# Patient Record
Sex: MALE | Race: OTHER | Employment: FULL TIME | URBAN - METROPOLITAN AREA
[De-identification: names, ages, dates, MRNs, and addresses within clinical notes are randomized per-mention and may not be internally consistent; named-entity substitution may affect disease eponyms.]

---

## 2023-12-23 RX ORDER — SODIUM CHLORIDE, SODIUM LACTATE, POTASSIUM CHLORIDE, CALCIUM CHLORIDE 600; 310; 30; 20 MG/100ML; MG/100ML; MG/100ML; MG/100ML
75 INJECTION, SOLUTION INTRAVENOUS CONTINUOUS
Status: CANCELLED | OUTPATIENT
Start: 2023-12-23

## 2023-12-26 ENCOUNTER — HOSPITAL ENCOUNTER (OUTPATIENT)
Dept: GASTROENTEROLOGY | Facility: AMBULARY SURGERY CENTER | Age: 38
Setting detail: OUTPATIENT SURGERY
Discharge: HOME | End: 2023-12-26
Payer: COMMERCIAL

## 2023-12-26 ENCOUNTER — ANESTHESIA (OUTPATIENT)
Dept: GASTROENTEROLOGY | Facility: AMBULARY SURGERY CENTER | Age: 38
End: 2023-12-26

## 2023-12-26 ENCOUNTER — ANESTHESIA EVENT (OUTPATIENT)
Dept: GASTROENTEROLOGY | Facility: AMBULARY SURGERY CENTER | Age: 38
End: 2023-12-26

## 2023-12-26 VITALS
WEIGHT: 193 LBS | SYSTOLIC BLOOD PRESSURE: 121 MMHG | HEART RATE: 64 BPM | BODY MASS INDEX: 27.63 KG/M2 | DIASTOLIC BLOOD PRESSURE: 79 MMHG | TEMPERATURE: 97.4 F | HEIGHT: 70 IN | OXYGEN SATURATION: 98 % | RESPIRATION RATE: 23 BRPM

## 2023-12-26 DIAGNOSIS — R10.9 ABDOMINAL PAIN, UNSPECIFIED ABDOMINAL LOCATION: ICD-10-CM

## 2023-12-26 DIAGNOSIS — R10.30 LOWER ABDOMINAL PAIN: ICD-10-CM

## 2023-12-26 PROCEDURE — 43239 EGD BIOPSY SINGLE/MULTIPLE: CPT | Performed by: INTERNAL MEDICINE

## 2023-12-26 PROCEDURE — 45380 COLONOSCOPY AND BIOPSY: CPT | Performed by: INTERNAL MEDICINE

## 2023-12-26 PROCEDURE — 88305 TISSUE EXAM BY PATHOLOGIST: CPT | Performed by: PATHOLOGY

## 2023-12-26 RX ORDER — PROPOFOL 10 MG/ML
INJECTION, EMULSION INTRAVENOUS AS NEEDED
Status: DISCONTINUED | OUTPATIENT
Start: 2023-12-26 | End: 2023-12-26

## 2023-12-26 RX ORDER — PHENYLEPHRINE HCL IN 0.9% NACL 1 MG/10 ML
SYRINGE (ML) INTRAVENOUS AS NEEDED
Status: DISCONTINUED | OUTPATIENT
Start: 2023-12-26 | End: 2023-12-26

## 2023-12-26 RX ORDER — SODIUM CHLORIDE, SODIUM LACTATE, POTASSIUM CHLORIDE, CALCIUM CHLORIDE 600; 310; 30; 20 MG/100ML; MG/100ML; MG/100ML; MG/100ML
75 INJECTION, SOLUTION INTRAVENOUS CONTINUOUS
Status: DISCONTINUED | OUTPATIENT
Start: 2023-12-26 | End: 2023-12-30 | Stop reason: HOSPADM

## 2023-12-26 RX ORDER — LIDOCAINE HYDROCHLORIDE 20 MG/ML
INJECTION, SOLUTION EPIDURAL; INFILTRATION; INTRACAUDAL; PERINEURAL AS NEEDED
Status: DISCONTINUED | OUTPATIENT
Start: 2023-12-26 | End: 2023-12-26

## 2023-12-26 RX ADMIN — SODIUM CHLORIDE, SODIUM LACTATE, POTASSIUM CHLORIDE, AND CALCIUM CHLORIDE 75 ML/HR: .6; .31; .03; .02 INJECTION, SOLUTION INTRAVENOUS at 10:36

## 2023-12-26 RX ADMIN — PROPOFOL 50 MG: 10 INJECTION, EMULSION INTRAVENOUS at 10:54

## 2023-12-26 RX ADMIN — PROPOFOL 200 MG: 10 INJECTION, EMULSION INTRAVENOUS at 10:51

## 2023-12-26 RX ADMIN — PROPOFOL 50 MG: 10 INJECTION, EMULSION INTRAVENOUS at 10:52

## 2023-12-26 RX ADMIN — PROPOFOL 50 MG: 10 INJECTION, EMULSION INTRAVENOUS at 11:01

## 2023-12-26 RX ADMIN — PROPOFOL 50 MG: 10 INJECTION, EMULSION INTRAVENOUS at 10:56

## 2023-12-26 RX ADMIN — LIDOCAINE HYDROCHLORIDE 100 MG: 20 INJECTION, SOLUTION EPIDURAL; INFILTRATION; INTRACAUDAL; PERINEURAL at 10:51

## 2023-12-26 RX ADMIN — PROPOFOL 50 MG: 10 INJECTION, EMULSION INTRAVENOUS at 10:58

## 2023-12-26 RX ADMIN — Medication 100 MCG: at 11:05

## 2023-12-26 NOTE — ANESTHESIA POSTPROCEDURE EVALUATION
Post-Op Assessment Note    CV Status:  Stable  Pain Score: 0    Pain management: adequate       Mental Status:  Alert   Hydration Status:  Stable   PONV Controlled:  None   Airway Patency:  Patent     Post Op Vitals Reviewed: Yes      Staff: CRNA               /78 (12/26/23 1116)    Temp (!) 97.4 °F (36.3 °C) (12/26/23 1116)    Pulse 84 (12/26/23 1116)   Resp 22 (12/26/23 1116)    SpO2 99 % (12/26/23 1116)

## 2023-12-26 NOTE — ANESTHESIA PREPROCEDURE EVALUATION
Procedure:  COLONOSCOPY  EGD    Relevant Problems   No relevant active problems        Physical Exam    Airway    Mallampati score: II  TM Distance: >3 FB  Neck ROM: full     Dental       Cardiovascular  Rhythm: regular, Rate: normal    Pulmonary   Breath sounds clear to auscultation    Other Findings        Anesthesia Plan  ASA Score- 1     Anesthesia Type- IV sedation with anesthesia with ASA Monitors.         Additional Monitors:     Airway Plan:            Plan Factors-    Chart reviewed.        Patient is not a current smoker.              Induction- intravenous.    Postoperative Plan-     Informed Consent- Anesthetic plan and risks discussed with patient.  I personally reviewed this patient with the CRNA. Discussed and agreed on the Anesthesia Plan with the CRNA..

## 2023-12-26 NOTE — H&P
"History and Physical -  [facility]  Radames Zepeda 38 y.o. male MRN: [id number]                  HPI: Radames Zepeda is a 38 y.o. year old male who presents for EGD and colonoscopy for assessment of abdominal pain      REVIEW OF SYSTEMS: Per the HPI, and otherwise unremarkable.    Historical Information   History reviewed. No pertinent past medical history.  History reviewed. No pertinent surgical history.  Social History   Social History     Substance and Sexual Activity   Alcohol Use Not Currently     Social History     Substance and Sexual Activity   Drug Use Never     Social History     Tobacco Use   Smoking Status Never   Smokeless Tobacco Never     Family History   Problem Relation Age of Onset    Cancer Mother        Meds/Allergies     (Not in a hospital admission)      No Known Allergies    Objective     Blood pressure 124/80, pulse 72, temperature (!) 96.9 °F (36.1 °C), resp. rate 18, height 5' 10\" (1.778 m), weight 87.5 kg (193 lb), SpO2 100%.      PHYSICAL EXAM    Gen: NAD  CV: RRR  CHEST: Clear  ABD: soft, NT/ND  EXT: no edema      ASSESSMENT/PLAN:  This is a 38 y.o. year old male here for EGD and colonoscopy           "

## 2023-12-28 PROCEDURE — 88305 TISSUE EXAM BY PATHOLOGIST: CPT | Performed by: PATHOLOGY

## 2024-01-25 ENCOUNTER — OFFICE VISIT (OUTPATIENT)
Dept: GASTROENTEROLOGY | Facility: CLINIC | Age: 39
End: 2024-01-25
Payer: COMMERCIAL

## 2024-01-25 VITALS
SYSTOLIC BLOOD PRESSURE: 120 MMHG | DIASTOLIC BLOOD PRESSURE: 80 MMHG | HEIGHT: 70 IN | TEMPERATURE: 97.4 F | BODY MASS INDEX: 28.2 KG/M2 | WEIGHT: 197 LBS

## 2024-01-25 DIAGNOSIS — R10.13 POSTPRANDIAL EPIGASTRIC PAIN: Primary | ICD-10-CM

## 2024-01-25 PROCEDURE — 99214 OFFICE O/P EST MOD 30 MIN: CPT | Performed by: PHYSICIAN ASSISTANT

## 2024-01-25 RX ORDER — HYOSCYAMINE SULFATE 0.125 MG
0.12 TABLET ORAL EVERY 4 HOURS PRN
Qty: 120 TABLET | Refills: 2 | Status: SHIPPED | OUTPATIENT
Start: 2024-01-25

## 2024-01-25 NOTE — PATIENT INSTRUCTIONS
HIDA scan & Gastric Emptying scheduled at [facility]. GE scheduled for 4/11/24 HIDA scan for 2/1/24   pt. accidentally took 5 pills of amlodipine/olmesartan 10/40mg at 8am. c/o dizziness, chest discomfort, headache and feeling of swelling of the neck. BP in triage WDL. pt. accidentally took 5 pills of amlodipine/olmesartan 10/40mg at 8am. c/o dizziness, chest discomfort, headache and feeling of "swelling of the neck" BP in triage WDL.

## 2024-01-25 NOTE — PROGRESS NOTES
"[facility] - Outpatient Follow-up Note  Radames Zepeda 38 y.o. male MRN: [id number]  Encounter: [id number]          ASSESSMENT AND PLAN:      Radames is a 39 y/o male who presents for follow-up.      1. L Flank pain  2. Post-prandial upper abdominal pain  Colonoscopy completely normal with random colon bx negative for microscopic colitis. EGD noted mild erythema with gastric bx negative for H.pylori and duodenal bx negative for celiac. KUB WNL. CTA 8/2023 also WNL. Today: he still notes that he gets L flank pain daily but he does believe this is MS related. However, he says recently he has also been getting LUQ pain that radiates to RUQ after eating daily. He says that he does have an appetite but when he eats, he is unable to finish his meals. He says he would like \"all remaining GI testing done\" at this time. He says the surgery team did not feel his umbilical hernia was the cause of any of his issues. Of note: pt says bentyl and omeprazole both did not help.   -please follow-up with PCP as again, I do suspect his L-flank pain is MS in etiology   -GES and HIDA scan both ordered   -start levsin PRN pain    -explained to pt that if both of these tests are also WNL, would recommend discussing functional etiology as he has been under a lot of stress lately   ______________________________________________________________________    SUBJECTIVE:  he still notes that he gets L flank pain daily but he does believe this is MS related. However, he says recently he has also been getting LUQ pain that radiates to RUQ after eating daily. He says that he does have an appetite but when he eats, he is unable to finish his meals. He says he would like \"all remaining GI testing done\" at this time. He says the surgery team did not feel his umbilical hernia was the cause of any of his issues. Pt says bentyl and omeprazole both did not help. Pt denies unintentional weight loss, fevers, " chills, n/v, dysphagia, constipation, or further diarrhea, bloody or black BM, NSAID use.       REVIEW OF SYSTEMS IS OTHERWISE NEGATIVE.      Historical Information   No past medical history on file.  No past surgical history on file.  Social History   Social History     Substance and Sexual Activity   Alcohol Use Not Currently     Social History     Substance and Sexual Activity   Drug Use Never     Social History     Tobacco Use   Smoking Status Never   Smokeless Tobacco Never     Family History   Problem Relation Age of Onset    Cancer Mother        Meds/Allergies       Current Outpatient Medications:     Cholecalciferol (Vitamin D3) 50 MCG (2000 UT) capsule    polyethylene glycol (GOLYTELY) 4000 mL solution    No Known Allergies        Objective     There were no vitals taken for this visit. There is no height or weight on file to calculate BMI.      PHYSICAL EXAM:      General Appearance:   Alert, cooperative, no distress   HEENT:   Normocephalic, atraumatic, anicteric.     Neck:  Supple, symmetrical, trachea midline   Lungs:   Clear to auscultation bilaterally; no rales, rhonchi or wheezing; respirations unlabored    Heart::   Regular rate and rhythm; no murmur, rub, or gallop.   Abdomen:   Soft, non-tender, non-distended; normal bowel sounds; no masses, no organomegaly    Genitalia:   Deferred    Rectal:   Deferred    Extremities:  No cyanosis, clubbing or edema    Pulses:  2+ and symmetric    Skin:  No jaundice, rashes, or lesions    Lymph nodes:  No palpable cervical lymphadenopathy        Lab Results:   No visits with results within 1 Day(s) from this visit.   Latest known visit with results is:   Hospital Outpatient Visit on 12/26/2023   Component Date Value    Case Report 12/26/2023                      Value:Surgical Pathology Report                         Case: [id number]                                   Authorizing Provider:  Kraig Gomes MD  Collected:           12/26/2023 1053               Ordering Location:     [facility]        Received:            12/26/2023 1520                                     [facility] [place]                                                    Pathologist:           Ephraim Miller MD                                                                 Specimens:   A) - Duodenum, small bowel bx ro celiac                                                             B) - Stomach, stomach bx ro celiac                                                                  C) - Colon, random colon bx RO microscopic colitis                                         Final Diagnosis 12/26/2023                      Value:This result contains rich text formatting which cannot be displayed here.    Note 12/26/2023                      Value:This result contains rich text formatting which cannot be displayed here.    Additional Information 12/26/2023                      Value:This result contains rich text formatting which cannot be displayed here.    Gross Description 12/26/2023                      Value:This result contains rich text formatting which cannot be displayed here.         Radiology Results:   XR HIP 2 TO 3 VIEWS WITHOUT PELVIS LEFT    Result Date: 1/19/2024  Narrative: Examination: Two-view left hip Comparisons: Left hip with AP pelvis dated 7/20/2023. INDICATION: Left hip pain. FINDINGS: 2 views of the left hip demonstrate no evidence of a fracture, malalignment or suspicious osseous lesion in the left hip. There is no evidence of arthritis in the left hip. The left SI joint is unremarkable. The soft tissues appear normal.    Impression: IMPRESSION: Unremarkable two-view left hip. Workstation:[id number]    EGD    Result Date: 12/26/2023  Narrative: Table formatting from the original result was not included. [facility] [place] [address] [facility] [address] [phone] DATE OF SERVICE: 12/26/23 PHYSICIAN(S): Attending: Kraig Ariza  MD Eliseo Fellow: No Staff Documented INDICATION: Abdominal pain, unspecified abdominal location POST-OP DIAGNOSIS: See the impression below. PREPROCEDURE: Informed consent was obtained for the procedure, including sedation.  Risks of perforation, hemorrhage, adverse drug reaction and aspiration were discussed. The patient was placed in the left lateral decubitus position. Patient was explained about the risks and benefits of the procedure. Risks including but not limited to bleeding, infection, and perforation were explained in detail. Also explained about less than 100% sensitivity with the exam and other alternatives. PROCEDURE: EGD DETAILS OF PROCEDURE: Patient was taken to the procedure room where a time out was performed to confirm correct patient and correct procedure. The patient underwent monitored anesthesia care, which was administered by an anesthesia professional. The patient's blood pressure, heart rate, level of consciousness, respirations and oxygen were monitored throughout the procedure. The scope was advanced to the second part of the duodenum. Retroflexion was performed in the fundus. The patient experienced no blood loss. The procedure was not difficult. The patient tolerated the procedure well. There were no apparent adverse events. ANESTHESIA INFORMATION: ASA: I Anesthesia Type: IV Sedation with Anesthesia MEDICATIONS: lactated ringers infusion 400 mL*  *From user-documented volume (Totals for administrations occurring from 1045 to 1111 on 12/26/23) FINDINGS: The upper third of the esophagus, middle third of the esophagus and lower third of the esophagus appeared normal. Regular Z-line 42 cm from the incisors Mild erythematous mucosa in the antrum; performed cold forceps biopsy to rule out H. pylori The 1st part of the duodenum and 2nd part of the duodenum appeared normal. Performed random biopsy using biopsy forceps to rule out celiac disease. SPECIMENS: ID Type Source Tests Collected by  Time Destination 1 : small bowel bx ro celiac Tissue Duodenum TISSUE EXAM Kraig Gomes MD 12/26/2023 10:53 AM  2 : stomach bx ro celiac Tissue Stomach TISSUE EXAM Kraig Gomes MD 12/26/2023 10:54 AM  3 : random colon bx RO microscopic colitis Tissue Colon TISSUE EXAM Kraig Gomes MD 12/26/2023 11:06 AM      Impression: The upper third of the esophagus, middle third of the esophagus and lower third of the esophagus appeared normal. Mild erythematous mucosa in the antrum; performed cold forceps biopsy The 1st part of the duodenum and 2nd part of the duodenum appeared normal. Performed random biopsy to rule out celiac disease. RECOMMENDATION:  Await pathology results   Kraig Gomes MD     Colonoscopy    Result Date: 12/26/2023  Narrative: Table formatting from the original result was not included. [facility] [address] [facility] [address] [phone] DATE OF SERVICE: 12/26/23 PHYSICIAN(S): Attending: Kraig Gomes MD Fellow: No Staff Documented INDICATION: Lower abdominal pain, Abdominal pain, unspecified abdominal location POST-OP DIAGNOSIS: See the impression below. HISTORY: Prior colonoscopy: No prior colonoscopy. BOWEL PREPARATION: Miralax/Dulcolax PREPROCEDURE: Informed consent was obtained for the procedure, including sedation. Risks including but not limited to bleeding, infection, perforation, adverse drug reaction and aspiration were explained in detail. Also explained about less than 100% sensitivity with the exam and other alternatives. The patient was placed in the left lateral decubitus position. Procedure: Colonoscopy DETAILS OF PROCEDURE: Patient was taken to the procedure room where a time out was performed to confirm correct patient and correct procedure. The patient underwent monitored anesthesia care, which was administered by an anesthesia professional. The patient's blood pressure, heart rate, level of  consciousness, oxygen, respirations and ECG were monitored throughout the procedure. A digital rectal exam was performed. The scope was introduced through the anus and advanced to the cecum. Retroflexion was performed in the rectum. The quality of bowel preparation was evaluated using the Perdido Bowel Preparation Scale with scores of: right colon = 2, transverse colon = 2, left colon = 2. The total BBPS score was 6. Bowel prep was adequate. The patient experienced no blood loss. The procedure was not difficult. The patient tolerated the procedure well. There were no apparent adverse events. ANESTHESIA INFORMATION: ASA: I Anesthesia Type: IV Sedation with Anesthesia MEDICATIONS: lactated ringers infusion 400 mL*  *From user-documented volume (Totals for administrations occurring from 1045 to 1111 on 12/26/23) FINDINGS: The cecum, ascending colon, transverse colon, descending colon, sigmoid colon and rectum appeared normal. Performed random biopsy using biopsy forceps. EVENTS: Procedure Events Event Event Time ENDO CECUM REACHED 12/26/2023 11:02 AM ENDO SCOPE OUT TIME 12/26/2023 11:10 AM SPECIMENS: ID Type Source Tests Collected by Time Destination 1 : small bowel bx ro celiac Tissue Duodenum TISSUE EXAM Kraig Gomes MD 12/26/2023 10:53 AM  2 : stomach bx ro celiac Tissue Stomach TISSUE EXAM Kraig Gomes MD 12/26/2023 10:54 AM  3 : random colon bx RO microscopic colitis Tissue Colon TISSUE EXAM Kraig Gomes MD 12/26/2023 11:06 AM  EQUIPMENT: Colonoscope -[id number]     Impression: The cecum, ascending colon, transverse colon, descending colon, sigmoid colon and rectum appeared normal. Performed random biopsy using biopsy forceps. RECOMMENDATION:  Repeat screening colonoscopy in 10 years   Kraig Gomes MD

## 2024-04-17 ENCOUNTER — OFFICE VISIT (OUTPATIENT)
Dept: FAMILY MEDICINE CLINIC | Facility: CLINIC | Age: 39
End: 2024-04-17
Payer: COMMERCIAL

## 2024-04-17 VITALS
TEMPERATURE: 97 F | SYSTOLIC BLOOD PRESSURE: 124 MMHG | DIASTOLIC BLOOD PRESSURE: 82 MMHG | OXYGEN SATURATION: 99 % | HEART RATE: 70 BPM | WEIGHT: 195 LBS | BODY MASS INDEX: 27.98 KG/M2

## 2024-04-17 DIAGNOSIS — L73.9 FOLLICULITIS: ICD-10-CM

## 2024-04-17 DIAGNOSIS — R10.12 LEFT UPPER QUADRANT PAIN: Primary | ICD-10-CM

## 2024-04-17 DIAGNOSIS — M54.9 MID BACK PAIN ON LEFT SIDE: ICD-10-CM

## 2024-04-17 PROCEDURE — 99214 OFFICE O/P EST MOD 30 MIN: CPT | Performed by: FAMILY MEDICINE

## 2024-04-17 RX ORDER — BACLOFEN 10 MG/1
10 TABLET ORAL 3 TIMES DAILY PRN
Qty: 30 TABLET | Refills: 0 | Status: SHIPPED | OUTPATIENT
Start: 2024-04-17

## 2024-04-17 RX ORDER — GABAPENTIN 100 MG/1
100 CAPSULE ORAL 3 TIMES DAILY
Qty: 90 CAPSULE | Refills: 0 | Status: SHIPPED | OUTPATIENT
Start: 2024-04-17

## 2024-04-17 RX ORDER — IBUPROFEN 800 MG/1
800 TABLET ORAL EVERY 8 HOURS PRN
Qty: 30 TABLET | Refills: 0 | Status: SHIPPED | OUTPATIENT
Start: 2024-04-17

## 2024-04-18 RX ORDER — CLINDAMYCIN PHOSPHATE 10 MG/G
GEL TOPICAL 2 TIMES DAILY
Qty: 60 G | Refills: 0 | Status: SHIPPED | OUTPATIENT
Start: 2024-04-18

## 2024-04-18 NOTE — PROGRESS NOTES
Subjective:      Patient ID: Radames Zepeda is a 39 y.o. male.    Has 2 months of right upper abdominal pain and also pain over his right mid back, saw GI and had normal EGD and colonoscopy and CT abdomen which were normal,pain worse with position and intermittent, denies diarrhea and constipation    Abdominal Pain  This is a recurrent problem. The current episode started more than 1 month ago. The onset quality is undetermined. The problem occurs daily. The most recent episode lasted 1 months. The problem has been waxing and waning. The pain is located in the generalized abdominal region. The pain is at a severity of 5/10. The quality of the pain is dull. The abdominal pain radiates to the left shoulder. Associated symptoms include belching, constipation, headaches, myalgias and weight loss. Pertinent negatives include no anorexia, arthralgias, diarrhea, dysuria, fever, flatus, frequency, hematochezia, hematuria, melena, nausea or vomiting. The pain is aggravated by being still. The pain is relieved by Movement. Prior diagnostic workup includes lower endoscopy.       Past Medical History:   Diagnosis Date    Clotting disorder (HCC) Months    Mouth    Headache(784.0) Month    Light left side       Family History   Problem Relation Age of Onset    Cancer Mother        History reviewed. No pertinent surgical history.     reports that he has never smoked. He has never used smokeless tobacco. He reports that he does not currently use alcohol. He reports that he does not use drugs.      Current Outpatient Medications:     baclofen 10 mg tablet, Take 1 tablet (10 mg total) by mouth 3 (three) times a day as needed for muscle spasms, Disp: 30 tablet, Rfl: 0    clindamycin 1 % gel, Apply topically 2 (two) times a day, Disp: 60 g, Rfl: 0    gabapentin (Neurontin) 100 mg capsule, Take 1 capsule (100 mg total) by mouth 3 (three) times a day, Disp: 90 capsule, Rfl: 0    ibuprofen (MOTRIN) 800 mg tablet, Take 1  tablet (800 mg total) by mouth every 8 (eight) hours as needed for moderate pain, Disp: 30 tablet, Rfl: 0    The following portions of the patient's history were reviewed and updated as appropriate: allergies, current medications, past family history, past medical history, past social history, past surgical history and problem list.    Review of Systems   Constitutional:  Positive for weight loss. Negative for fever.   Gastrointestinal:  Positive for abdominal pain and constipation. Negative for anorexia, diarrhea, flatus, hematochezia, melena, nausea and vomiting.   Genitourinary:  Negative for dysuria, frequency and hematuria.   Musculoskeletal:  Positive for back pain (thoracic) and myalgias. Negative for arthralgias.   Skin:         Lesion on forehead since this morning   Neurological:  Positive for headaches.         PHQ-2/9 Depression Screening    Little interest or pleasure in doing things: 0 - not at all  Feeling down, depressed, or hopeless: 0 - not at all  PHQ-2 Score: 0  PHQ-2 Interpretation: Negative depression screen             Objective:    /82 (BP Location: Left arm, Patient Position: Sitting, Cuff Size: Standard)   Pulse 70   Temp (!) 97 °F (36.1 °C) (Tympanic)   Wt 88.5 kg (195 lb)   SpO2 99%   BMI 27.98 kg/m²      Physical Exam  Vitals and nursing note reviewed.   Constitutional:       Appearance: Normal appearance.   HENT:      Right Ear: External ear normal.      Left Ear: External ear normal.   Eyes:      General:         Right eye: No discharge.         Left eye: No discharge.      Conjunctiva/sclera: Conjunctivae normal.   Cardiovascular:      Rate and Rhythm: Normal rate and regular rhythm.      Heart sounds: No murmur heard.  Pulmonary:      Effort: Pulmonary effort is normal.      Breath sounds: Normal breath sounds. No wheezing.   Abdominal:      General: There is no distension.      Palpations: Abdomen is soft.      Tenderness: There is no abdominal tenderness.    Musculoskeletal:         General: No tenderness.      Right lower leg: No edema.      Left lower leg: No edema.   Skin:     Findings: Lesion (folliculitis on forehead) present. No rash.   Neurological:      Mental Status: He is alert. Mental status is at baseline.   Psychiatric:         Mood and Affect: Mood normal.         Thought Content: Thought content normal.           Recent Results (from the past 8736 hour(s))   LACTATE    Collection Time: 08/28/23 11:32 PM   Result Value Ref Range    LACTATE 0.6 0.5 - 2.2 mmol/L   COMPREHENSIVE METABOLIC PANEL    Collection Time: 08/28/23 11:32 PM   Result Value Ref Range    Glucose 96 65 - 99 mg/dL    BUN 19 7 - 28 mg/dL    Creatinine 1.10 0.53 - 1.30 mg/dL    Sodium 139 135 - 145 mmol/L    Potassium 3.4 (L) 3.5 - 5.2 mmol/L    Chloride 103 100 - 109 mmol/L    Carbon Dioxide 27 21 - 31 mmol/L    Calcium 9.5 8.5 - 10.1 mg/dL    Alkaline Phosphatase 41 35 - 120 U/L    ALBUMIN 4.6 3.5 - 5.7 g/dL    Total Bilirubin 1.1 (H) 0.2 - 1.0 mg/dL    Protein, Total 7.3 6.3 - 8.3 g/dL    AST 14 <41 U/L    ALT 16 <56 U/L    ANION GAP 9 3 - 11    eGFRcr 87 >59    eGFR Comment Interpretive information: calculated GFR    Tissue Exam    Collection Time: 12/26/23 10:53 AM   Result Value Ref Range    Case Report       Surgical Pathology Report                         Case: [id number]                                   Authorizing Provider:  Kraig Gomes MD  Collected:           12/26/2023 1053              Ordering Location:     [facility]        Received:            12/26/2023 [facility]                                     [facility]                                                    Pathologist:           Ephraim Miller MD                                                                 Specimens:   A) - Duodenum, small bowel bx ro celiac                                                             B) - Stomach, stomach bx ro celiac                                                                   C) - Colon, random colon bx RO microscopic colitis                                         Final Diagnosis       A. Duodenum, small bowel bx ro celiac:  - Benign duodenal mucosa without specific histopathologic abnormality.  - No intraepithelial lymphocytosis and no villous blunting.  - No epithelial dysplasia and no evidence of malignancy.    B. Stomach, stomach bx:  - Gastric antral and oxyntic mucosa with chronic, inactive gastritis.  - Negative for intestinal metaplasia, dysplasia or carcinoma.  - No Helicobacter pylori is identified on H&E stained slides.    C. Colon, random colon bx RO microscopic colitis:  - Colonic mucosa with focal lymphoid aggregates..  - No evidence of lymphocytic or collagenous colitis.        Note       Interpretation performed at [facility], [address] [facility] [address]        Additional Information       All reported additional testing was performed with appropriately reactive controls.  These tests were developed and their performance characteristics determined by [facility] or appropriate performing facility, though some tests may be performed on tissues which have not been validated for performance characteristics (such as staining performed on alcohol exposed cell blocks and decalcified tissues).  Results should be interpreted with caution and in the context of the patients’ clinical condition. These tests may not be cleared or approved by the U.S. Food and Drug Administration, though the FDA has determined that such clearance or approval is not necessary. These tests are used for clinical purposes and they should not be regarded as investigational or for research. This laboratory has been approved by CLIA 88, designated as a high-complexity laboratory and is qualified to perform these tests.  .      Gross Description       A. The specimen is received in formalin, labeled with the patient's name and hospital number, and is  "designated \" small bowel\".  Specimen consists of 1 tan soft tissue fragment measuring 0.4 cm in greatest dimension.  Entirely submitted. One screened cassette.  B. The specimen is received in formalin, labeled with the patient's name and hospital number, and is designated \" stomach\".  The specimen consists of 3 tan soft tissue fragments measuring range from 0.3-0.5 cm in greatest dimension.  Entirely submitted. One screened cassette.  C. The specimen is received in formalin, labeled with the patient's name and hospital number, and is designated \" random colon\".  The specimen consists of multiple tan soft tissue fragments measuring in aggregate of 0.5 x 0.5 x 0.1 cm.  Entirely submitted. One screened cassette.    Note: The estimated total formalin fixation time based upon information provided by the submitting clinician and the standard processing schedule is under 72 hours.      Supa         Laboratory Results: I have personally reviewed the pertinent laboratory results/reports     Radiology/Other Diagnostic Testing Results: I have personally reviewed pertinent reports.      EGD    Result Date: 12/26/2023  Table formatting from the original result was not included. [facility] [place] [address] [facility] [address] [phone] DATE OF SERVICE: 12/26/23 PHYSICIAN(S): Attending: Kraig Gomes MD Fellow: No Staff Documented INDICATION: Abdominal pain, unspecified abdominal location POST-OP DIAGNOSIS: See the impression below. PREPROCEDURE: Informed consent was obtained for the procedure, including sedation.  Risks of perforation, hemorrhage, adverse drug reaction and aspiration were discussed. The patient was placed in the left lateral decubitus position. Patient was explained about the risks and benefits of the procedure. Risks including but not limited to bleeding, infection, and perforation were explained in detail. Also explained about less than 100% sensitivity with " the exam and other alternatives. PROCEDURE: EGD DETAILS OF PROCEDURE: Patient was taken to the procedure room where a time out was performed to confirm correct patient and correct procedure. The patient underwent monitored anesthesia care, which was administered by an anesthesia professional. The patient's blood pressure, heart rate, level of consciousness, respirations and oxygen were monitored throughout the procedure. The scope was advanced to the second part of the duodenum. Retroflexion was performed in the fundus. The patient experienced no blood loss. The procedure was not difficult. The patient tolerated the procedure well. There were no apparent adverse events. ANESTHESIA INFORMATION: ASA: I Anesthesia Type: IV Sedation with Anesthesia MEDICATIONS: lactated ringers infusion 400 mL*  *From user-documented volume (Totals for administrations occurring from 1045 to 1111 on 12/26/23) FINDINGS: The upper third of the esophagus, middle third of the esophagus and lower third of the esophagus appeared normal. Regular Z-line 42 cm from the incisors Mild erythematous mucosa in the antrum; performed cold forceps biopsy to rule out H. pylori The 1st part of the duodenum and 2nd part of the duodenum appeared normal. Performed random biopsy using biopsy forceps to rule out celiac disease. SPECIMENS: ID Type Source Tests Collected by Time Destination 1 : small bowel bx ro celiac Tissue Duodenum TISSUE EXAM Kraig Gomes MD 12/26/2023 10:53 AM  2 : stomach bx ro celiac Tissue Stomach TISSUE EXAM Kraig Gomes MD 12/26/2023 10:54 AM  3 : random colon bx RO microscopic colitis Tissue Colon TISSUE EXAM Kraig Gomes MD 12/26/2023 11:06 AM      The upper third of the esophagus, middle third of the esophagus and lower third of the esophagus appeared normal. Mild erythematous mucosa in the antrum; performed cold forceps biopsy The 1st part of the duodenum and 2nd part of the duodenum appeared normal.  Performed random biopsy to rule out celiac disease. RECOMMENDATION:  Await pathology results   Kraig Gomes MD     Colonoscopy    Result Date: 12/26/2023  Table formatting from the original result was not included. [facility] [address] [facility] [place] [address] [phone] DATE OF SERVICE: 12/26/23 PHYSICIAN(S): Attending: Kraig Gomes MD Fellow: No Staff Documented INDICATION: Lower abdominal pain, Abdominal pain, unspecified abdominal location POST-OP DIAGNOSIS: See the impression below. HISTORY: Prior colonoscopy: No prior colonoscopy. BOWEL PREPARATION: Miralax/Dulcolax PREPROCEDURE: Informed consent was obtained for the procedure, including sedation. Risks including but not limited to bleeding, infection, perforation, adverse drug reaction and aspiration were explained in detail. Also explained about less than 100% sensitivity with the exam and other alternatives. The patient was placed in the left lateral decubitus position. Procedure: Colonoscopy DETAILS OF PROCEDURE: Patient was taken to the procedure room where a time out was performed to confirm correct patient and correct procedure. The patient underwent monitored anesthesia care, which was administered by an anesthesia professional. The patient's blood pressure, heart rate, level of consciousness, oxygen, respirations and ECG were monitored throughout the procedure. A digital rectal exam was performed. The scope was introduced through the anus and advanced to the cecum. Retroflexion was performed in the rectum. The quality of bowel preparation was evaluated using the [place] Bowel Preparation Scale with scores of: right colon = 2, transverse colon = 2, left colon = 2. The total BBPS score was 6. Bowel prep was adequate. The patient experienced no blood loss. The procedure was not difficult. The patient tolerated the procedure well. There were no apparent adverse events. ANESTHESIA INFORMATION:  ASA: I Anesthesia Type: IV Sedation with Anesthesia MEDICATIONS: lactated ringers infusion 400 mL*  *From user-documented volume (Totals for administrations occurring from 1045 to 1111 on 12/26/23) FINDINGS: The cecum, ascending colon, transverse colon, descending colon, sigmoid colon and rectum appeared normal. Performed random biopsy using biopsy forceps. EVENTS: Procedure Events Event Event Time ENDO CECUM REACHED 12/26/2023 11:02 AM ENDO SCOPE OUT TIME 12/26/2023 11:10 AM SPECIMENS: ID Type Source Tests Collected by Time Destination 1 : small bowel bx ro celiac Tissue Duodenum TISSUE EXAM Kraig Gomes MD 12/26/2023 10:53 AM  2 : stomach bx ro celiac Tissue Stomach TISSUE EXAM Kraig Gomes MD 12/26/2023 10:54 AM  3 : random colon bx RO microscopic colitis Tissue Colon TISSUE EXAM Kraig Gomes MD 12/26/2023 11:06 AM  EQUIPMENT: Colonoscope -[id number]     The cecum, ascending colon, transverse colon, descending colon, sigmoid colon and rectum appeared normal. Performed random biopsy using biopsy forceps. RECOMMENDATION:  Repeat screening colonoscopy in 10 years   Kraig Gomes MD        Assessment/Plan:  Problem List Items Addressed This Visit    None  Visit Diagnoses       Left upper quadrant pain    -  Primary    Relevant Orders    Comprehensive metabolic panel    Lipase    Amylase    Encounter for immunization        Mid back pain on left side        Relevant Medications    ibuprofen (MOTRIN) 800 mg tablet    gabapentin (Neurontin) 100 mg capsule    baclofen 10 mg tablet    Other Relevant Orders    XR spine thoracic 3 vw    Folliculitis        Relevant Medications    clindamycin 1 % gel            Tdap today  Reviewed and discussed labs  Suspect radiating thoracic back pain to the abdomen, given it is worse with laying and ongoing for months with pain free intervals and normal GI workup  Check xray thoracic spine, suspect this is musculoskeletal  Start ibuprofen prn ,  "baclofen hs and gabapentin 100 mg tid                  Read package inserts for all medications before starting a new medications, call me if you have any questions.    Patient was given opportunity to ask questions and all questions were answered.    Disclaimer: Portions of the record may have been created with voice recognition software. Occasional wrong word or \"sound a like\" substitutions may have occurred due to the inherent limitations of voice recognition software. Read the chart carefully and recognize, using context, where substitutions have occurred. I have used the Epic copy/forward function to compose this note. I have reviewed my current note to ensure it reflects the current patient status, exam, assessment and plan.    "

## 2024-04-20 ENCOUNTER — HOSPITAL ENCOUNTER (OUTPATIENT)
Dept: RADIOLOGY | Facility: HOSPITAL | Age: 39
Discharge: HOME | End: 2024-04-20
Payer: COMMERCIAL

## 2024-04-20 ENCOUNTER — APPOINTMENT (OUTPATIENT)
Dept: LAB | Facility: HOSPITAL | Age: 39
End: 2024-04-20
Payer: COMMERCIAL

## 2024-04-20 DIAGNOSIS — R10.12 LEFT UPPER QUADRANT PAIN: ICD-10-CM

## 2024-04-20 DIAGNOSIS — M54.9 MID BACK PAIN ON LEFT SIDE: ICD-10-CM

## 2024-04-20 LAB
ALBUMIN SERPL BCP-MCNC: 4.5 G/DL (ref 3.5–5)
ALP SERPL-CCNC: 31 U/L (ref 34–104)
ALT SERPL W P-5'-P-CCNC: 12 U/L (ref 7–52)
AMYLASE SERPL-CCNC: 48 IU/L (ref 29–103)
ANION GAP SERPL CALCULATED.3IONS-SCNC: 8 MMOL/L (ref 4–13)
AST SERPL W P-5'-P-CCNC: 14 U/L (ref 13–39)
BILIRUB SERPL-MCNC: 1.54 MG/DL (ref 0.2–1)
BUN SERPL-MCNC: 16 MG/DL (ref 5–25)
CALCIUM SERPL-MCNC: 9.5 MG/DL (ref 8.4–10.2)
CHLORIDE SERPL-SCNC: 105 MMOL/L (ref 96–108)
CO2 SERPL-SCNC: 27 MMOL/L (ref 21–32)
CREAT SERPL-MCNC: 1.02 MG/DL (ref 0.6–1.3)
GFR SERPL CREATININE-BSD FRML MDRD: 91 ML/MIN/1.73SQ M
GLUCOSE P FAST SERPL-MCNC: 106 MG/DL (ref 65–99)
LIPASE SERPL-CCNC: 11 U/L (ref 11–82)
POTASSIUM SERPL-SCNC: 4 MMOL/L (ref 3.5–5.3)
PROT SERPL-MCNC: 7.1 G/DL (ref 6.4–8.4)
SODIUM SERPL-SCNC: 140 MMOL/L (ref 135–147)

## 2024-04-20 PROCEDURE — 36415 COLL VENOUS BLD VENIPUNCTURE: CPT

## 2024-04-20 PROCEDURE — 72072 X-RAY EXAM THORAC SPINE 3VWS: CPT

## 2024-04-20 PROCEDURE — 82150 ASSAY OF AMYLASE: CPT

## 2024-04-20 PROCEDURE — 80053 COMPREHEN METABOLIC PANEL: CPT

## 2024-04-20 PROCEDURE — 83690 ASSAY OF LIPASE: CPT

## 2024-04-22 DIAGNOSIS — R17 ELEVATED BILIRUBIN: Primary | ICD-10-CM

## 2024-04-26 ENCOUNTER — HOSPITAL ENCOUNTER (OUTPATIENT)
Dept: ULTRASOUND IMAGING | Facility: HOSPITAL | Age: 39
End: 2024-04-26
Attending: FAMILY MEDICINE
Payer: COMMERCIAL

## 2024-04-26 DIAGNOSIS — R17 ELEVATED BILIRUBIN: ICD-10-CM

## 2024-04-26 PROCEDURE — 76705 ECHO EXAM OF ABDOMEN: CPT

## 2024-05-13 ENCOUNTER — RA CDI HCC (OUTPATIENT)
Dept: OTHER | Facility: HOSPITAL | Age: 39
End: 2024-05-13

## 2024-05-13 NOTE — PROGRESS NOTES
HCC coding opportunities       Chart reviewed, no opportunity found: CHART REVIEWED, NO OPPORTUNITY FOUND        Patients Insurance        Commercial Insurance: [employer]

## 2024-05-15 DIAGNOSIS — L73.9 FOLLICULITIS: ICD-10-CM

## 2024-05-16 RX ORDER — CLINDAMYCIN PHOSPHATE 10 MG/G
GEL TOPICAL 2 TIMES DAILY
Qty: 60 G | Refills: 0 | Status: SHIPPED | OUTPATIENT
Start: 2024-05-16 | End: 2024-05-20

## 2024-05-20 ENCOUNTER — OFFICE VISIT (OUTPATIENT)
Dept: FAMILY MEDICINE CLINIC | Facility: CLINIC | Age: 39
End: 2024-05-20
Payer: COMMERCIAL

## 2024-05-20 VITALS
DIASTOLIC BLOOD PRESSURE: 80 MMHG | SYSTOLIC BLOOD PRESSURE: 120 MMHG | OXYGEN SATURATION: 98 % | HEART RATE: 59 BPM | BODY MASS INDEX: 27.75 KG/M2 | WEIGHT: 193.8 LBS | TEMPERATURE: 97.4 F | HEIGHT: 70 IN

## 2024-05-20 DIAGNOSIS — N40.0 PROSTATE ENLARGEMENT: ICD-10-CM

## 2024-05-20 DIAGNOSIS — Z11.59 NEED FOR HEPATITIS C SCREENING TEST: ICD-10-CM

## 2024-05-20 DIAGNOSIS — R20.2 PARESTHESIA OF FOOT: ICD-10-CM

## 2024-05-20 DIAGNOSIS — Z13.6 ENCOUNTER FOR LIPID SCREENING FOR CARDIOVASCULAR DISEASE: ICD-10-CM

## 2024-05-20 DIAGNOSIS — Z23 ENCOUNTER FOR IMMUNIZATION: ICD-10-CM

## 2024-05-20 DIAGNOSIS — Z13.1 ENCOUNTER FOR SCREENING FOR DIABETES MELLITUS: ICD-10-CM

## 2024-05-20 DIAGNOSIS — E55.9 VITAMIN D DEFICIENCY: ICD-10-CM

## 2024-05-20 DIAGNOSIS — R20.2 PARESTHESIAS IN RIGHT HAND: ICD-10-CM

## 2024-05-20 DIAGNOSIS — Z13.220 ENCOUNTER FOR LIPID SCREENING FOR CARDIOVASCULAR DISEASE: ICD-10-CM

## 2024-05-20 DIAGNOSIS — M54.9 MID BACK PAIN ON LEFT SIDE: ICD-10-CM

## 2024-05-20 DIAGNOSIS — Z12.5 PROSTATE CANCER SCREENING ENCOUNTER, OPTIONS AND RISKS DISCUSSED: ICD-10-CM

## 2024-05-20 DIAGNOSIS — Z00.01 ENCOUNTER FOR GENERAL ADULT MEDICAL EXAMINATION WITH ABNORMAL FINDINGS: Primary | ICD-10-CM

## 2024-05-20 DIAGNOSIS — E80.4 GILBERT SYNDROME: ICD-10-CM

## 2024-05-20 PROCEDURE — 99214 OFFICE O/P EST MOD 30 MIN: CPT | Performed by: FAMILY MEDICINE

## 2024-05-20 PROCEDURE — 90471 IMMUNIZATION ADMIN: CPT | Performed by: FAMILY MEDICINE

## 2024-05-20 PROCEDURE — 99396 PREV VISIT EST AGE 40-64: CPT | Performed by: FAMILY MEDICINE

## 2024-05-20 PROCEDURE — 90715 TDAP VACCINE 7 YRS/> IM: CPT | Performed by: FAMILY MEDICINE

## 2024-05-20 RX ORDER — GABAPENTIN 100 MG/1
100 CAPSULE ORAL 3 TIMES DAILY
Qty: 90 CAPSULE | Refills: 0 | Status: SHIPPED | OUTPATIENT
Start: 2024-05-20

## 2024-05-20 NOTE — PROGRESS NOTES
Adult Annual Physical  Name: Radames Zepeda      : 1985      MRN: [id number]  Encounter Provider: Angela Napier MD  Encounter Date: 2024   Encounter department: [facility] MEDICINE    Assessment & Plan   1. Encounter for general adult medical examination with abnormal findings  -     CBC and differential; Future  -     Lipid panel; Future  -     Hemoglobin A1C; Future  2. Encounter for immunization  -     TDAP VACCINE GREATER THAN OR EQUAL TO 6YO IM  3. Gilbert syndrome  4. Prostate cancer screening encounter, options and risks discussed  5. Prostate enlargement  -     PSA, total and free; Future  6. Paresthesia of foot  -     Vitamin B12; Future  -     Folate; Future  -     Magnesium; Future  -     CBC and differential; Future  -     Vitamin D 25 hydroxy; Future  7. Paresthesias in right hand  -     Vitamin B12; Future  -     Folate; Future  -     Magnesium; Future  -     CBC and differential; Future  -     Vitamin D 25 hydroxy; Future  8. Vitamin D deficiency  -     Vitamin D 25 hydroxy; Future  9. Encounter for screening for diabetes mellitus  -     Hemoglobin A1C; Future  10. Encounter for lipid screening for cardiovascular disease  -     Lipid panel; Future  11. Need for hepatitis C screening test  12. Mid back pain on left side  -     gabapentin (Neurontin) 100 mg capsule; Take 1 capsule (100 mg total) by mouth 3 (three) times a day  Recommend labs as above, start gabapentin as previously discussed. Discussed cmp- with elevated biliaurbin, normal US liver and gall bladder, has Gilbert syndrome. No further intervention needed.  Immunizations and preventive care screenings were discussed with patient today. Appropriate education was printed on patient's after visit summary.    Discussed risks and benefits of prostate cancer screening. We discussed the controversial history of PSA screening for prostate cancer in the [place] as well as the risk of over  detection and over treatment of prostate cancer by way of PSA screening.  The patient understands that PSA blood testing is an imperfect way to screen for prostate cancer and that elevated PSA levels in the blood may also be caused by infection, inflammation, prostatic trauma or manipulation, urological procedures, or by benign prostatic enlargement.    The role of the digital rectal examination in prostate cancer screening was also discussed and I discussed with him that there is large interobserver variability in the findings of digital rectal examination.    Counseling:  Alcohol/drug use: discussed moderation in alcohol intake, the recommendations for healthy alcohol use, and avoidance of illicit drug use.  Dental Health: discussed importance of regular tooth brushing, flossing, and dental visits.  Injury prevention: discussed safety/seat belts, safety helmets, smoke detectors, carbon dioxide detectors, and smoking near bedding or upholstery.  Sexual health: discussed sexually transmitted diseases, partner selection, use of condoms, avoidance of unintended pregnancy, and contraceptive alternatives.  Exercise: the importance of regular exercise/physical activity was discussed. Recommend exercise 3-5 times per week for at least 30 minutes.          History of Present Illness     Adult Annual Physical:  Patient presents for annual physical. Here for follow up and annual physical, did not start gabapentin as he did not have pain, does feel pulling in stretching over left upper abdomen  He does take muscle relaxer which helps  Has tingling and numbness in right middle finger, left great toe.     Diet and Physical Activity:  - Diet/Nutrition: well balanced diet, limited junk food and low fat diet.  - Exercise: moderate cardiovascular exercise.    General Health:  - Sleep: sleeps well.  - Hearing:. grossly normal  - Vision: no vision problems.  - Dental: no dental visits for > 1 year.    /GYN Health:    - History of  "STDs: no     Health:  - History of STDs: no.     Review of Systems   Constitutional:  Negative for chills and fever.   HENT:  Negative for congestion, rhinorrhea and sore throat.    Eyes:  Negative for discharge, redness and itching.   Respiratory:  Negative for chest tightness, shortness of breath and wheezing.    Cardiovascular:  Negative for chest pain and palpitations.   Gastrointestinal:  Negative for abdominal pain, constipation and diarrhea.   Genitourinary:  Negative for dysuria.   Musculoskeletal:  Positive for back pain.   Skin:  Negative for pallor and rash.   Neurological:  Positive for numbness. Negative for dizziness, weakness and headaches.     Medical History Reviewed by provider this encounter:  Tobacco  Allergies  Meds  Problems  Med Hx  Surg Hx  Fam Hx         Objective     /80 (BP Location: Right arm)   Pulse 59   Temp (!) 97.4 °F (36.3 °C) (Tympanic)   Ht 5' 9.69\" (1.77 m)   Wt 87.9 kg (193 lb 12.8 oz)   SpO2 98%   BMI 28.06 kg/m²     Physical Exam  Vitals and nursing note reviewed.   Constitutional:       General: He is not in acute distress.     Appearance: Normal appearance. He is well-developed and normal weight. He is not ill-appearing or toxic-appearing.   HENT:      Head: Normocephalic and atraumatic.      Right Ear: Tympanic membrane, ear canal and external ear normal.      Left Ear: Tympanic membrane, ear canal and external ear normal.      Nose: No mucosal edema or rhinorrhea.      Mouth/Throat:      Mouth: Mucous membranes are moist. Mucous membranes are not pale and not cyanotic.      Pharynx: Oropharynx is clear. No oropharyngeal exudate or posterior oropharyngeal erythema.   Eyes:      General: No scleral icterus.        Right eye: No discharge.         Left eye: No discharge.      Extraocular Movements: Extraocular movements intact.      Conjunctiva/sclera: Conjunctivae normal.      Pupils: Pupils are equal, round, and reactive to light.   Cardiovascular:      " Rate and Rhythm: Normal rate and regular rhythm.      Heart sounds: Normal heart sounds. No murmur heard.     No gallop.   Pulmonary:      Effort: Pulmonary effort is normal. No respiratory distress.      Breath sounds: Normal breath sounds. No wheezing or rales.   Abdominal:      General: Abdomen is flat.      Palpations: Abdomen is soft.      Tenderness: There is no abdominal tenderness.   Musculoskeletal:         General: No swelling, tenderness, deformity or signs of injury.      Cervical back: Normal range of motion.      Right lower leg: No edema.      Left lower leg: No edema.   Skin:     General: Skin is warm.      Coloration: Skin is not jaundiced or pale.      Findings: No rash.   Neurological:      General: No focal deficit present.      Mental Status: He is alert and oriented to person, place, and time.   Psychiatric:         Mood and Affect: Mood normal.         Behavior: Behavior normal.         Thought Content: Thought content normal.         Judgment: Judgment normal.       Administrative Statements

## 2024-10-19 ENCOUNTER — APPOINTMENT (OUTPATIENT)
Dept: LAB | Facility: HOSPITAL | Age: 39
End: 2024-10-19
Attending: FAMILY MEDICINE
Payer: COMMERCIAL

## 2024-10-19 DIAGNOSIS — Z13.220 ENCOUNTER FOR LIPID SCREENING FOR CARDIOVASCULAR DISEASE: ICD-10-CM

## 2024-10-19 DIAGNOSIS — Z13.6 ENCOUNTER FOR LIPID SCREENING FOR CARDIOVASCULAR DISEASE: ICD-10-CM

## 2024-10-19 DIAGNOSIS — Z13.1 ENCOUNTER FOR SCREENING FOR DIABETES MELLITUS: ICD-10-CM

## 2024-10-19 DIAGNOSIS — R20.2 PARESTHESIAS IN RIGHT HAND: ICD-10-CM

## 2024-10-19 DIAGNOSIS — E55.9 VITAMIN D DEFICIENCY: ICD-10-CM

## 2024-10-19 DIAGNOSIS — N40.0 PROSTATE ENLARGEMENT: ICD-10-CM

## 2024-10-19 DIAGNOSIS — R20.2 PARESTHESIA OF FOOT: ICD-10-CM

## 2024-10-19 DIAGNOSIS — Z00.01 ENCOUNTER FOR GENERAL ADULT MEDICAL EXAMINATION WITH ABNORMAL FINDINGS: ICD-10-CM

## 2024-10-19 LAB
25(OH)D3 SERPL-MCNC: 14.8 NG/ML (ref 30–100)
BASOPHILS # BLD AUTO: 0.05 THOUSANDS/ΜL (ref 0–0.1)
BASOPHILS NFR BLD AUTO: 1 % (ref 0–1)
CHOLEST SERPL-MCNC: 192 MG/DL
EOSINOPHIL # BLD AUTO: 0.21 THOUSAND/ΜL (ref 0–0.61)
EOSINOPHIL NFR BLD AUTO: 4 % (ref 0–6)
ERYTHROCYTE [DISTWIDTH] IN BLOOD BY AUTOMATED COUNT: 12.6 % (ref 11.6–15.1)
EST. AVERAGE GLUCOSE BLD GHB EST-MCNC: 111 MG/DL
FOLATE SERPL-MCNC: 10.2 NG/ML
HBA1C MFR BLD: 5.5 %
HCT VFR BLD AUTO: 48.3 % (ref 36.5–49.3)
HDLC SERPL-MCNC: 43 MG/DL
HGB BLD-MCNC: 15.8 G/DL (ref 12–17)
IMM GRANULOCYTES # BLD AUTO: 0.02 THOUSAND/UL (ref 0–0.2)
IMM GRANULOCYTES NFR BLD AUTO: 0 % (ref 0–2)
LDLC SERPL CALC-MCNC: 134 MG/DL (ref 0–100)
LYMPHOCYTES # BLD AUTO: 1.59 THOUSANDS/ΜL (ref 0.6–4.47)
LYMPHOCYTES NFR BLD AUTO: 29 % (ref 14–44)
MAGNESIUM SERPL-MCNC: 2.2 MG/DL (ref 1.9–2.7)
MCH RBC QN AUTO: 29.6 PG (ref 26.8–34.3)
MCHC RBC AUTO-ENTMCNC: 32.7 G/DL (ref 31.4–37.4)
MCV RBC AUTO: 91 FL (ref 82–98)
MONOCYTES # BLD AUTO: 0.58 THOUSAND/ΜL (ref 0.17–1.22)
MONOCYTES NFR BLD AUTO: 11 % (ref 4–12)
NEUTROPHILS # BLD AUTO: 3.02 THOUSANDS/ΜL (ref 1.85–7.62)
NEUTS SEG NFR BLD AUTO: 55 % (ref 43–75)
NONHDLC SERPL-MCNC: 149 MG/DL
NRBC BLD AUTO-RTO: 0 /100 WBCS
PLATELET # BLD AUTO: 210 THOUSANDS/UL (ref 149–390)
PMV BLD AUTO: 11.6 FL (ref 8.9–12.7)
PSA FREE MFR SERPL: 52.24 %
PSA FREE SERPL-MCNC: 0.21 NG/ML
PSA SERPL-MCNC: 0.4 NG/ML (ref 0–4)
RBC # BLD AUTO: 5.33 MILLION/UL (ref 3.88–5.62)
TRIGL SERPL-MCNC: 75 MG/DL
VIT B12 SERPL-MCNC: 210 PG/ML (ref 180–914)
WBC # BLD AUTO: 5.47 THOUSAND/UL (ref 4.31–10.16)

## 2024-10-19 PROCEDURE — 85025 COMPLETE CBC W/AUTO DIFF WBC: CPT

## 2024-10-19 PROCEDURE — 83036 HEMOGLOBIN GLYCOSYLATED A1C: CPT

## 2024-10-19 PROCEDURE — 36415 COLL VENOUS BLD VENIPUNCTURE: CPT

## 2024-10-19 PROCEDURE — 84153 ASSAY OF PSA TOTAL: CPT

## 2024-10-19 PROCEDURE — 82607 VITAMIN B-12: CPT

## 2024-10-19 PROCEDURE — 82746 ASSAY OF FOLIC ACID SERUM: CPT

## 2024-10-19 PROCEDURE — 83735 ASSAY OF MAGNESIUM: CPT

## 2024-10-19 PROCEDURE — 80061 LIPID PANEL: CPT

## 2024-10-19 PROCEDURE — 84154 ASSAY OF PSA FREE: CPT

## 2024-10-19 PROCEDURE — 82306 VITAMIN D 25 HYDROXY: CPT

## 2024-10-22 DIAGNOSIS — E55.9 VITAMIN D DEFICIENCY: Primary | ICD-10-CM

## 2024-10-24 ENCOUNTER — TELEPHONE (OUTPATIENT)
Dept: FAMILY MEDICINE CLINIC | Facility: CLINIC | Age: 39
End: 2024-10-24

## 2024-10-24 NOTE — TELEPHONE ENCOUNTER
----- Message from Angela Napier MD sent at 10/22/2024  1:08 PM EDT -----  Normal labs except Vit d is low.Advise taking vit d2  50k weekly and vit d3 1000 IU oral daily otc. Take daily 1200 mg of calcium and vit d fortified foods.

## 2024-11-05 ENCOUNTER — RA CDI HCC (OUTPATIENT)
Dept: OTHER | Facility: HOSPITAL | Age: 39
End: 2024-11-05

## 2024-11-05 NOTE — PROGRESS NOTES
HCC coding opportunities       Chart reviewed, no opportunity found: CHART REVIEWED, NO OPPORTUNITY FOUND        Patients Insurance        Commercial Insurance: [employer]

## 2024-11-06 DIAGNOSIS — E55.9 VITAMIN D DEFICIENCY: Primary | ICD-10-CM

## 2024-11-06 RX ORDER — ERGOCALCIFEROL 1.25 MG/1
50000 CAPSULE, LIQUID FILLED ORAL WEEKLY
Qty: 8 CAPSULE | Refills: 0 | Status: SHIPPED | OUTPATIENT
Start: 2024-11-06

## 2024-11-12 ENCOUNTER — OFFICE VISIT (OUTPATIENT)
Dept: FAMILY MEDICINE CLINIC | Facility: CLINIC | Age: 39
End: 2024-11-12
Payer: COMMERCIAL

## 2024-11-12 VITALS
HEIGHT: 70 IN | BODY MASS INDEX: 28.66 KG/M2 | DIASTOLIC BLOOD PRESSURE: 82 MMHG | OXYGEN SATURATION: 99 % | WEIGHT: 200.2 LBS | HEART RATE: 75 BPM | TEMPERATURE: 97 F | SYSTOLIC BLOOD PRESSURE: 126 MMHG | RESPIRATION RATE: 16 BRPM

## 2024-11-12 DIAGNOSIS — R10.10 PAIN OF UPPER ABDOMEN: ICD-10-CM

## 2024-11-12 DIAGNOSIS — M54.6 ACUTE MIDLINE THORACIC BACK PAIN: Primary | ICD-10-CM

## 2024-11-12 DIAGNOSIS — R07.81 RIB PAIN ON LEFT SIDE: ICD-10-CM

## 2024-11-12 DIAGNOSIS — M54.9 MID BACK PAIN ON LEFT SIDE: ICD-10-CM

## 2024-11-12 PROCEDURE — 99214 OFFICE O/P EST MOD 30 MIN: CPT | Performed by: FAMILY MEDICINE

## 2024-11-12 RX ORDER — BACLOFEN 10 MG/1
10 TABLET ORAL 3 TIMES DAILY PRN
Qty: 30 TABLET | Refills: 0 | Status: SHIPPED | OUTPATIENT
Start: 2024-11-12

## 2024-11-12 RX ORDER — GABAPENTIN 100 MG/1
100 CAPSULE ORAL 3 TIMES DAILY
Qty: 90 CAPSULE | Refills: 0 | Status: SHIPPED | OUTPATIENT
Start: 2024-11-12

## 2024-11-12 NOTE — PROGRESS NOTES
Subjective:      Patient ID: Radames Zepeda is a 39 y.o. male.    States that his back pain has returned, previously has tried gabapentin and baclofen with relief.  He states that it worsens when he is at work.  [place] from his house and thereafter 1-1/2-hour to [place] one-way and returns the same day.  He explains that his back pain is mainly thoracic  Midline and also left-sided around the shoulder blade or intrascapular area and radiates to the front to his chest and also his abdomen.  Previously x-ray of the thoracic spine was normal.  He feels like dull aching sharp shooting pain sometimes lasting for an hour, denies any chest pain    He denies blood in stool, vomiting, nausea, constipation, diarrhea, fever chills or weight loss    ,    Past Medical History:   Diagnosis Date    Clotting disorder (HCC) Months    Mouth    Headache(784.0) Month    Light left side       Family History   Problem Relation Age of Onset    Cancer Mother        History reviewed. No pertinent surgical history.     reports that he has never smoked. He has never used smokeless tobacco. He reports that he does not currently use alcohol. He reports that he does not use drugs.      Current Outpatient Medications:     baclofen 10 mg tablet, Take 1 tablet (10 mg total) by mouth 3 (three) times a day as needed for muscle spasms, Disp: 30 tablet, Rfl: 0    ergocalciferol (VITAMIN D2) 50,000 units, Take 1 capsule (50,000 Units total) by mouth once a week, Disp: 8 capsule, Rfl: 0    gabapentin (Neurontin) 100 mg capsule, Take 1 capsule (100 mg total) by mouth 3 (three) times a day, Disp: 90 capsule, Rfl: 0    The following portions of the patient's history were reviewed and updated as appropriate: allergies, current medications, past family history, past medical history, past social history, past surgical history and problem list.    Review of Systems      PHQ-2/9 Depression  "Screening    Little interest or pleasure in doing things: 0 - not at all  Feeling down, depressed, or hopeless: 0 - not at all  PHQ-2 Score: 0  PHQ-2 Interpretation: Negative depression screen             Objective:    /82 (BP Location: Left arm, Patient Position: Sitting, Cuff Size: Adult)   Pulse 75   Temp (!) 97 °F (36.1 °C) (Tympanic)   Resp 16   Ht 5' 10\" (1.778 m)   Wt 90.8 kg (200 lb 3.2 oz)   SpO2 99%   BMI 28.73 kg/m²      Physical Exam      Recent Results (from the past 8736 hour(s))   Tissue Exam    Collection Time: 12/26/23 10:53 AM   Result Value Ref Range    Case Report       Surgical Pathology Report                         Case: [id number]                                   Authorizing Provider:  Kraig Gomes MD  Collected:           12/26/2023 1053              Ordering Location:     [facility]        Received:            12/26/2023 [facility]                                     [facility]                                                    Pathologist:           Ephraim Miller MD                                                                 Specimens:   A) - Duodenum, small bowel bx ro celiac                                                             B) - Stomach, stomach bx ro celiac                                                                  C) - Colon, random colon bx RO microscopic colitis                                         Final Diagnosis       A. Duodenum, small bowel bx ro celiac:  - Benign duodenal mucosa without specific histopathologic abnormality.  - No intraepithelial lymphocytosis and no villous blunting.  - No epithelial dysplasia and no evidence of malignancy.    B. Stomach, stomach bx:  - Gastric antral and oxyntic mucosa with chronic, inactive gastritis.  - Negative for intestinal metaplasia, dysplasia or carcinoma.  - No Helicobacter pylori is identified on H&E stained slides.    C. Colon, random colon bx RO microscopic colitis:  - " "Colonic mucosa with focal lymphoid aggregates..  - No evidence of lymphocytic or collagenous colitis.        Note       Interpretation performed at [facility], [address] [facility] [address]        Additional Information       All reported additional testing was performed with appropriately reactive controls.  These tests were developed and their performance characteristics determined by [facility] or appropriate performing facility, though some tests may be performed on tissues which have not been validated for performance characteristics (such as staining performed on alcohol exposed cell blocks and decalcified tissues).  Results should be interpreted with caution and in the context of the patients’ clinical condition. These tests may not be cleared or approved by the U.S. Food and Drug Administration, though the FDA has determined that such clearance or approval is not necessary. These tests are used for clinical purposes and they should not be regarded as investigational or for research. This laboratory has been approved by CLIA 88, designated as a high-complexity laboratory and is qualified to perform these tests.  .      Gross Description       A. The specimen is received in formalin, labeled with the patient's name and hospital number, and is designated \" small bowel\".  Specimen consists of 1 tan soft tissue fragment measuring 0.4 cm in greatest dimension.  Entirely submitted. One screened cassette.  B. The specimen is received in formalin, labeled with the patient's name and hospital number, and is designated \" stomach\".  The specimen consists of 3 tan soft tissue fragments measuring range from 0.3-0.5 cm in greatest dimension.  Entirely submitted. One screened cassette.  C. The specimen is received in formalin, labeled with the patient's name and hospital number, and is designated \" random colon\".  The specimen consists of multiple tan soft tissue fragments measuring in " aggregate of 0.5 x 0.5 x 0.1 cm.  Entirely submitted. One screened cassette.    Note: The estimated total formalin fixation time based upon information provided by the submitting clinician and the standard processing schedule is under 72 hours.      [facility]     Comprehensive metabolic panel    Collection Time: 04/20/24  7:16 AM   Result Value Ref Range    Sodium 140 135 - 147 mmol/L    Potassium 4.0 3.5 - 5.3 mmol/L    Chloride 105 96 - 108 mmol/L    CO2 27 21 - 32 mmol/L    ANION GAP 8 4 - 13 mmol/L    BUN 16 5 - 25 mg/dL    Creatinine 1.02 0.60 - 1.30 mg/dL    Glucose, Fasting 106 (H) 65 - 99 mg/dL    Calcium 9.5 8.4 - 10.2 mg/dL    AST 14 13 - 39 U/L    ALT 12 7 - 52 U/L    Alkaline Phosphatase 31 (L) 34 - 104 U/L    Total Protein 7.1 6.4 - 8.4 g/dL    Albumin 4.5 3.5 - 5.0 g/dL    Total Bilirubin 1.54 (H) 0.20 - 1.00 mg/dL    eGFR 91 ml/min/1.73sq m   Lipase    Collection Time: 04/20/24  7:16 AM   Result Value Ref Range    Lipase 11 11 - 82 u/L   Amylase    Collection Time: 04/20/24  7:16 AM   Result Value Ref Range    Amylase 48 29 - 103 IU/L   PSA, total and free    Collection Time: 10/19/24  7:23 AM   Result Value Ref Range    PSA, Diagnostic 0.402 0.000 - 4.000 ng/mL    PSA, Free 0.210 ng/mL    PSA % Free 52.239 %   Vitamin B12    Collection Time: 10/19/24  7:23 AM   Result Value Ref Range    Vitamin B-12 210 180 - 914 pg/mL   Folate    Collection Time: 10/19/24  7:23 AM   Result Value Ref Range    Folate 10.2 >5.9 ng/mL   Magnesium    Collection Time: 10/19/24  7:23 AM   Result Value Ref Range    Magnesium 2.2 1.9 - 2.7 mg/dL   CBC and differential    Collection Time: 10/19/24  7:23 AM   Result Value Ref Range    WBC 5.47 4.31 - 10.16 Thousand/uL    RBC 5.33 3.88 - 5.62 Million/uL    Hemoglobin 15.8 12.0 - 17.0 g/dL    Hematocrit 48.3 36.5 - 49.3 %    MCV 91 82 - 98 fL    MCH 29.6 26.8 - 34.3 pg    MCHC 32.7 31.4 - 37.4 g/dL    RDW 12.6 11.6 - 15.1 %    MPV 11.6 8.9 - 12.7 fL    Platelets 210 149 - 390  Thousands/uL    nRBC 0 /100 WBCs    Segmented % 55 43 - 75 %    Immature Grans % 0 0 - 2 %    Lymphocytes % 29 14 - 44 %    Monocytes % 11 4 - 12 %    Eosinophils Relative 4 0 - 6 %    Basophils Relative 1 0 - 1 %    Absolute Neutrophils 3.02 1.85 - 7.62 Thousands/µL    Absolute Immature Grans 0.02 0.00 - 0.20 Thousand/uL    Absolute Lymphocytes 1.59 0.60 - 4.47 Thousands/µL    Absolute Monocytes 0.58 0.17 - 1.22 Thousand/µL    Eosinophils Absolute 0.21 0.00 - 0.61 Thousand/µL    Basophils Absolute 0.05 0.00 - 0.10 Thousands/µL   Vitamin D 25 hydroxy    Collection Time: 10/19/24  7:23 AM   Result Value Ref Range    Vit D, 25-Hydroxy 14.8 (L) 30.0 - 100.0 ng/mL   Lipid panel    Collection Time: 10/19/24  7:23 AM   Result Value Ref Range    Cholesterol 192 See Comment mg/dL    Triglycerides 75 See Comment mg/dL    HDL, Direct 43 >=40 mg/dL    LDL Calculated 134 (H) 0 - 100 mg/dL    Non-HDL-Chol (CHOL-HDL) 149 mg/dl   Hemoglobin A1C    Collection Time: 10/19/24  7:23 AM   Result Value Ref Range    Hemoglobin A1C 5.5 Normal 4.0-5.6%; PreDiabetic 5.7-6.4%; Diabetic >=6.5%; Glycemic control for adults with diabetes <7.0% %     mg/dl       Laboratory Results: I have personally reviewed the pertinent laboratory results/reports     Radiology/Other Diagnostic Testing Results: I have reviewed the following imaging and agree with the interpretation below.    US right upper quadrant    Result Date: 5/1/2024  RIGHT UPPER QUADRANT ULTRASOUND INDICATION: R17: Unspecified jaundice. Elevated bilirubin. COMPARISON: None TECHNIQUE: Real-time ultrasound of the right upper quadrant was performed with a curvilinear transducer with both volumetric sweeps and still imaging techniques. FINDINGS: PANCREAS: Portions of the pancreas are obscured by bowel gas. Visualized portions of the pancreas are unremarkable. AORTA AND IVC: Obscured by bowel gas. LIVER: Size: Within normal range. The liver measures 16.2 cm in the midclavicular line.  Contour: Surface contour is smooth. Parenchyma: Echogenicity and echotexture are within normal limits. No liver mass identified. Limited imaging of the main portal vein shows it to be patent and hepatopetal. BILIARY: The gallbladder is normal in caliber. No wall thickening or pericholecystic fluid. No stones or sludge identified. No sonographic Austin's sign. No intrahepatic biliary dilatation. CBD measures 6.0 mm. No choledocholithiasis. KIDNEY: Right kidney measures 10.3 x 6.5 x 7.0 cm. Volume 244.5 mL Kidney within normal limits. ASCITES: None.     No sonographic evidence of acute cholecystitis. No cholelithiasis. No biliary ductal dilation/CBD is at the upper limits of the Resident: EVELINE NEGRO I, the attending radiologist, have reviewed the images and agree with the final report above. Workstation performed: [id number]        Assessment/Plan:  1. Acute midline thoracic back pain  -     XR ribs bilateral 4+ vw w pa chest; Future; Expected date: 11/12/2024  -     MRI thoracic spine wo contrast; Future; Expected date: 11/12/2024  2. Rib pain on left side  -     XR ribs bilateral 4+ vw w pa chest; Future; Expected date: 11/12/2024  3. Pain of upper abdomen  -     CT abdomen wo contrast; Future; Expected date: 11/12/2024  4. Mid back pain on left side  -     baclofen 10 mg tablet; Take 1 tablet (10 mg total) by mouth 3 (three) times a day as needed for muscle spasms  -     gabapentin (Neurontin) 100 mg capsule; Take 1 capsule (100 mg total) by mouth 3 (three) times a day    Previously upper abdominal ultrasound was normal however pancreas was not visualized.  Given the pain radiates to his chest as well as abdomen would recommend CT of the abdomen and x-ray of the ribs with a PA chest x-ray.  Patient insists on an MRI of the thoracic spine given previous thoracic x-ray was normal.  Given persistent symptoms since 6 months would recommend an MRI of the thoracic spine.  Discussed both the CT of the abdomen and  "MRI may not be covered by the insurance we will try to obtain a prior authorization however he should check price check with not covered.    Recommend resuming baclofen and gabapentin for his back pain.               Read package inserts for all medications before starting a new medications, call me if you have any questions.    Patient was given opportunity to ask questions and all questions were answered.    Disclaimer: Portions of the record may have been created with voice recognition software. Occasional wrong word or \"sound a like\" substitutions may have occurred due to the inherent limitations of voice recognition software. Read the chart carefully and recognize, using context, where substitutions have occurred. I have used the Epic copy/forward function to compose this note. I have reviewed my current note to ensure it reflects the current patient status, exam, assessment and plan.    "

## 2024-11-14 ENCOUNTER — HOSPITAL ENCOUNTER (OUTPATIENT)
Dept: NUCLEAR MEDICINE | Facility: HOSPITAL | Age: 39
Discharge: HOME | End: 2024-11-14
Payer: COMMERCIAL

## 2024-11-14 DIAGNOSIS — R10.13 POSTPRANDIAL EPIGASTRIC PAIN: ICD-10-CM

## 2024-11-14 PROCEDURE — G1004 CDSM NDSC: HCPCS

## 2024-11-14 PROCEDURE — 78227 HEPATOBIL SYST IMAGE W/DRUG: CPT

## 2024-11-14 PROCEDURE — A9537 TC99M MEBROFENIN: HCPCS

## 2024-11-14 RX ORDER — SINCALIDE 5 UG/5ML
0.02 INJECTION, POWDER, LYOPHILIZED, FOR SOLUTION INTRAVENOUS ONCE
Status: COMPLETED | OUTPATIENT
Start: 2024-11-14 | End: 2024-11-14

## 2024-11-14 RX ADMIN — SINCALIDE 1.8 MCG: 5 INJECTION, POWDER, LYOPHILIZED, FOR SOLUTION INTRAVENOUS at 11:59

## 2024-11-15 ENCOUNTER — RESULTS FOLLOW-UP (OUTPATIENT)
Dept: GASTROENTEROLOGY | Facility: CLINIC | Age: 39
End: 2024-11-15

## 2025-01-22 ENCOUNTER — HOSPITAL ENCOUNTER (OUTPATIENT)
Dept: NON INVASIVE DIAGNOSTICS | Facility: CLINIC | Age: 40
Discharge: HOME | End: 2025-01-22
Payer: COMMERCIAL

## 2025-01-22 DIAGNOSIS — R10.13 POSTPRANDIAL EPIGASTRIC PAIN: ICD-10-CM

## 2025-01-22 PROCEDURE — 78264 GASTRIC EMPTYING IMG STUDY: CPT

## 2025-01-22 PROCEDURE — G1004 CDSM NDSC: HCPCS

## 2025-01-22 PROCEDURE — A9541 TC99M SULFUR COLLOID: HCPCS

## 2025-01-24 ENCOUNTER — OFFICE VISIT (OUTPATIENT)
Age: 40
End: 2025-01-24
Payer: COMMERCIAL

## 2025-01-24 VITALS
HEIGHT: 70 IN | HEART RATE: 74 BPM | BODY MASS INDEX: 28.63 KG/M2 | SYSTOLIC BLOOD PRESSURE: 110 MMHG | DIASTOLIC BLOOD PRESSURE: 80 MMHG | WEIGHT: 200 LBS | OXYGEN SATURATION: 99 %

## 2025-01-24 DIAGNOSIS — N48.1 BALANITIS: ICD-10-CM

## 2025-01-24 DIAGNOSIS — N40.0 BENIGN PROSTATIC HYPERPLASIA WITHOUT LOWER URINARY TRACT SYMPTOMS: Primary | ICD-10-CM

## 2025-01-24 LAB — POST-VOID RESIDUAL VOLUME, ML POC: 15 ML

## 2025-01-24 PROCEDURE — 99213 OFFICE O/P EST LOW 20 MIN: CPT | Performed by: PHYSICIAN ASSISTANT

## 2025-01-24 PROCEDURE — 51798 US URINE CAPACITY MEASURE: CPT | Performed by: PHYSICIAN ASSISTANT

## 2025-01-24 RX ORDER — CLOTRIMAZOLE AND BETAMETHASONE DIPROPIONATE 10; .64 MG/G; MG/G
CREAM TOPICAL 2 TIMES DAILY
Qty: 15 G | Refills: 1 | Status: SHIPPED | OUTPATIENT
Start: 2025-01-24

## 2025-01-24 NOTE — PROGRESS NOTES
UROLOGY PROGRESS NOTE   Patient Identifiers: Radames Zepeda (MRN [id number])  Date of Service: 1/24/2025    Subjective:   39-year-old man who saw [facility] last year for BPH.  He was offered tamsulosin but did not take it.  Those symptoms seem to have resolved.  He comes in complaining of soreness around the foreskin particularly underneath on the frenulum.  He is not diabetic.  He denies any discharge.    Reason for visit: Foreskin problem    Objective:     VITALS:    Vitals:    01/24/25 1431   BP: 110/80   Pulse: 74   SpO2: 99%     AUA SYMPTOM SCORE      Flowsheet Row Most Recent Value   AUA SYMPTOM SCORE    How often have you had a sensation of not emptying your bladder completely after you finished urinating? 1 (P)     How often have you had to urinate again less than two hours after you finished urinating? 1 (P)     How often have you found you stopped and started again several times when you urinate? 1 (P)     How often have you found it difficult to postpone urination? 1 (P)     How often have you had a weak urinary stream? 1 (P)     How often have you had to push or strain to begin urination? 1 (P)     How many times did you most typically get up to urinate from the time you went to bed at night until the time you got up in the morning? 1 (P)     Quality of Life: If you were to spend the rest of your life with your urinary condition just the way it is now, how would you feel about that? 1 (P)     AUA SYMPTOM SCORE 7 (P)                LABS:  Lab Results   Component Value Date    HGB 15.8 10/19/2024    HCT 48.3 10/19/2024    WBC 5.47 10/19/2024     10/19/2024   ]    Lab Results   Component Value Date    K 4.0 04/20/2024     04/20/2024    CO2 27 04/20/2024    BUN 16 04/20/2024    CREATININE 1.02 04/20/2024    CALCIUM 9.5 04/20/2024   ]        INPATIENT MEDS:    Current Outpatient Medications:     baclofen 10 mg tablet, Take 1 tablet (10 mg total) by mouth 3 (three) times a day as  "needed for muscle spasms, Disp: 30 tablet, Rfl: 0    clotrimazole-betamethasone (LOTRISONE) 1-0.05 % cream, Apply topically 2 (two) times a day, Disp: 15 g, Rfl: 1    ergocalciferol (VITAMIN D2) 50,000 units, Take 1 capsule (50,000 Units total) by mouth once a week, Disp: 8 capsule, Rfl: 0    gabapentin (Neurontin) 100 mg capsule, Take 1 capsule (100 mg total) by mouth 3 (three) times a day, Disp: 90 capsule, Rfl: 0      Physical Exam:   /80 (BP Location: Left arm, Patient Position: Sitting, Cuff Size: Standard)   Pulse 74   Ht 5' 10\" (1.778 m)   Wt 90.7 kg (200 lb)   SpO2 99%   BMI 28.70 kg/m²   GEN: no acute distress    RESP: breathing comfortably with no accessory muscle use    ABD: soft, non-tender, non-distended   INCISION:    EXT: no significant peripheral edema   (Male): Penis uncircumcised, phallus normal, meatus patent.  Mild redness on the frenulum testicles descended into scrotum bilaterally without masses nor tenderness.  No inguinal hernias bilaterally.    RADIOLOGY:   None    Assessment:   #1.  Balanitis    Plan:   -He has some mild redness in the frenulum.  He is not diabetic.  He may have some mild cracking  -I sent a prescription for Lotrisone to use twice a day he should call me in a few weeks if is not resolved  -  -          "

## 2025-01-31 ENCOUNTER — HOSPITAL ENCOUNTER (OUTPATIENT)
Dept: MRI IMAGING | Facility: HOSPITAL | Age: 40
End: 2025-01-31
Attending: FAMILY MEDICINE
Payer: COMMERCIAL

## 2025-01-31 DIAGNOSIS — M54.6 ACUTE MIDLINE THORACIC BACK PAIN: ICD-10-CM

## 2025-01-31 PROCEDURE — 72146 MRI CHEST SPINE W/O DYE: CPT

## 2025-02-03 ENCOUNTER — PATIENT MESSAGE (OUTPATIENT)
Age: 40
End: 2025-02-03

## 2025-02-03 ENCOUNTER — RESULTS FOLLOW-UP (OUTPATIENT)
Dept: FAMILY MEDICINE CLINIC | Facility: CLINIC | Age: 40
End: 2025-02-03

## 2025-02-03 DIAGNOSIS — G93.9 CEREBELLAR LESION: Primary | ICD-10-CM

## 2025-03-02 ENCOUNTER — HOSPITAL ENCOUNTER (OUTPATIENT)
Dept: MRI IMAGING | Facility: HOSPITAL | Age: 40
Discharge: HOME | End: 2025-03-02
Attending: FAMILY MEDICINE
Payer: COMMERCIAL

## 2025-03-02 DIAGNOSIS — G93.9 CEREBELLAR LESION: ICD-10-CM

## 2025-03-02 PROCEDURE — 70553 MRI BRAIN STEM W/O & W/DYE: CPT

## 2025-03-02 PROCEDURE — A9585 GADOBUTROL INJECTION: HCPCS | Performed by: FAMILY MEDICINE

## 2025-03-02 RX ORDER — GADOBUTROL 604.72 MG/ML
9 INJECTION INTRAVENOUS
Status: COMPLETED | OUTPATIENT
Start: 2025-03-02 | End: 2025-03-02

## 2025-03-02 RX ADMIN — GADOBUTROL 9 ML: 604.72 INJECTION INTRAVENOUS at 15:02

## 2025-03-05 ENCOUNTER — RESULTS FOLLOW-UP (OUTPATIENT)
Dept: FAMILY MEDICINE CLINIC | Facility: CLINIC | Age: 40
End: 2025-03-05

## 2025-05-20 ENCOUNTER — TELEPHONE (OUTPATIENT)
Age: 40
End: 2025-05-20

## 2025-05-20 NOTE — TELEPHONE ENCOUNTER
Called pt due to appt request was sent. Pt stated having difficulty urinating and burning sensation. Call went to  and VM left for pt to call to make appt if still needed

## 2025-05-21 NOTE — TELEPHONE ENCOUNTER
Patient's wife called back to schedule an appointment at the [place] office for difficulty urinating and burning sensation. NO available slots until November.    Patient call back [phone]

## 2025-05-27 ENCOUNTER — TELEPHONE (OUTPATIENT)
Dept: FAMILY MEDICINE CLINIC | Facility: CLINIC | Age: 40
End: 2025-05-27